# Patient Record
Sex: MALE | Race: WHITE | Employment: OTHER | URBAN - METROPOLITAN AREA
[De-identification: names, ages, dates, MRNs, and addresses within clinical notes are randomized per-mention and may not be internally consistent; named-entity substitution may affect disease eponyms.]

---

## 2017-08-30 ENCOUNTER — HOSPITAL ENCOUNTER (OUTPATIENT)
Dept: GENERAL RADIOLOGY | Age: 72
Discharge: HOME | End: 2017-08-30
Payer: MEDICARE

## 2017-08-30 DIAGNOSIS — M17.11 OSTEOARTHRITIS OF RIGHT KNEE, UNSPECIFIED OSTEOARTHRITIS TYPE: ICD-10-CM

## 2017-08-30 PROCEDURE — 73560 X-RAY EXAM OF KNEE 1 OR 2: CPT

## 2019-10-09 ENCOUNTER — HOSPITAL ENCOUNTER (OUTPATIENT)
Dept: GENERAL RADIOLOGY | Age: 74
Discharge: HOME | End: 2019-10-11
Payer: MEDICARE

## 2019-10-09 DIAGNOSIS — M17.11 OSTEOARTHRITIS OF RIGHT KNEE, UNSPECIFIED OSTEOARTHRITIS TYPE: ICD-10-CM

## 2019-10-09 PROCEDURE — 73560 X-RAY EXAM OF KNEE 1 OR 2: CPT

## 2024-02-02 ENCOUNTER — CLINICAL SUPPORT (OUTPATIENT)
Dept: ORTHOPEDIC SURGERY | Facility: CLINIC | Age: 79
End: 2024-02-02
Payer: COMMERCIAL

## 2024-02-02 ENCOUNTER — OFFICE VISIT (OUTPATIENT)
Dept: ORTHOPEDIC SURGERY | Facility: CLINIC | Age: 79
End: 2024-02-02
Payer: COMMERCIAL

## 2024-02-02 DIAGNOSIS — M17.11 PRIMARY OSTEOARTHRITIS OF RIGHT KNEE: Primary | ICD-10-CM

## 2024-02-02 DIAGNOSIS — M25.561 RIGHT KNEE PAIN, UNSPECIFIED CHRONICITY: ICD-10-CM

## 2024-02-02 PROCEDURE — 1036F TOBACCO NON-USER: CPT | Performed by: ORTHOPAEDIC SURGERY

## 2024-02-02 PROCEDURE — 1159F MED LIST DOCD IN RCRD: CPT | Performed by: ORTHOPAEDIC SURGERY

## 2024-02-02 PROCEDURE — 73564 X-RAY EXAM KNEE 4 OR MORE: CPT | Mod: RIGHT SIDE | Performed by: ORTHOPAEDIC SURGERY

## 2024-02-02 PROCEDURE — 99214 OFFICE O/P EST MOD 30 MIN: CPT | Performed by: ORTHOPAEDIC SURGERY

## 2024-02-02 ASSESSMENT — PAIN - FUNCTIONAL ASSESSMENT: PAIN_FUNCTIONAL_ASSESSMENT: NO/DENIES PAIN

## 2024-02-03 NOTE — PROGRESS NOTES
New patient to me 78-year-old gentleman presents complaining of right-sided knee pain states he had a long history of right-sided knee trouble he has had extensive conservative treatment he is having popping clicking grinding swelling stiffness and pain difficulty bearing weight difficulty stand for proper time symptoms seem to be getting progressively worse mostly on the medial side of the right knee.    Location of pain: Medial aspect right knee  Quality of pain: Ongoing pain for many years seems to be worse with activity better with rest  Modifying factors: Worse when he goes up and down stairs or stands for plan.  Time or walks on uneven ground better with rest  Associated signs and symptoms: Some popping clicking grinding and swelling  Previous treatment: He has had extensive conservative treatment with gel and cortisone without improvement        The patient's past medical history, family history, social history, and review of systems were documented on the patient's medical intake form.  The medical intake form was reviewed and scanned into the electronic medical record for future use.  History is otherwise negative except as stated in the HPI.    Physical exam    General: Alert and oriented to place, person, and time.  No acute distress and breathing comfortably; pleasant and cooperative with the examination.  HEENT: Head is normocephalic and atraumatic.  Neck: Supple, no visible swelling.  Cardiovascular: Good perfusion to the affected extremity.  Lungs: No audible wheezing or labored breathing.  Abdomen: Nondistended  HEME/Lymph : No visible abnormalities bilateral lower extremity    Extremity:  The affected knee was examined and inspected and was tender to touch along the medial aspect.  The patient has catching/locking and occasional mechanical symptoms.  The skin was intact without breakdown or open wounds.  There was a mild Beatriz exam seen with mild evidence of instability  and weakness in the collateral ligaments with laxity to varus valgus stress and in the anterior posterior plane.  There was a negative Lachman's test, pivot shift test, and posterior drawer sign.  There was no foot drop, numbness or tingling.  Sensation, reflexes, and pulses in the foot and ankle were present.  There was an effusion but range of motion was good and straight leg raise testing was normal.   The patient had the ability to bear weight but with discomfort.  The patient's gait was antalgic secondary to the discomfort. Knee range of motion was 5-115    Diagnostics:      XR knee right 4+ views    Result Date: 2/2/2024  Interpreted By:  Vicki Butt, STUDY: XR KNEE RIGHT 4+ VIEWS;  ;  2/2/2024 9:51 am   INDICATION: Signs/Symptoms:pain.   ACCESSION NUMBER(S): [id number]   ORDERING CLINICIAN: VICKI BUTT   FINDINGS: Right knee weightbearing four views. Severe knee arthritis is varus deformity bone-on-bone articulation medial joint space large osteophyte formation moderate knee effusion no signs of fracture dislocation or other bony abnormality     Signed by: Vicki Butt 2/2/2024 10:33 AM Dictation workstation:   [id number]         Procedures  [none   ]    Assessment:  Osteoarthritis right knee    Treatment plan:  1.  The natural history of the condition and its associated treatment alternatives including surgical and nonsurgical options were discussed with the patient at length.  2.  Patient with significant right-sided knee arthritis we discussed surgical nonsurgical options she is having severe impairment of bodily function is interested in knee replacement surgery.  He is trying to work out the timing of knee replacement surgery I explained it takes about 6 to 8 weeks for scheduling he is can a call schedule over the phone.  See me back for preop visit just prior.  The procedures risk benefits treatment surgeries and postop course were discussed and he understands wishes to  proceed.  3.  Patient has failed all forms of conservative treatment.  The joint pain is significantly affecting quality of life and activities of daily living.    The patient has pain in the joint that is increased with activity and weightbearing, and walks with an antalgic gait.  The pain is interfering with activities of daily living.  Patient has limited range of motion and pain with passive range of motion.  X-rays demonstrate joint space narrowing, subchondral sclerosis and osteophyte formation.  Symptoms are not improving with medication, physical therapy or supportive device for a period of at least 3 months.  Weight loss and smoking cessation have been discussed with the patient.  Patient advised on when to cease smoking 6-8 weeks before the procedure.      Patient would like to go and schedule joint replacement surgery.  The procedure its risks, benefits, and treatment alternatives were discussed at length and patient would like to proceed.  Patient is going to schedule the procedure at their earliest convenience and see me back for a preop visit just prior.  Preadmission testing, medical checkup, and insurance authorization will be performed in the meantime.  Patient understands a joint replacement surgery is a last resort, although a very successful operation results are not guaranteed.  4.  All of the patient's questions were answered.    Orders Placed This Encounter    XR knee right 4+ views       This note was prepared using voice recognition software.  The details of this note are correct and have been reviewed, and corrected to the best of my ability.  Some grammatical areas may persist related to the Dragon software    Dontrell Butt MD  Senior Attending Physician  [facility]  [facility] [place]    [phone]

## 2024-09-24 ENCOUNTER — OFFICE VISIT (OUTPATIENT)
Dept: ORTHOPEDIC SURGERY | Facility: CLINIC | Age: 79
End: 2024-09-24
Payer: COMMERCIAL

## 2024-09-24 DIAGNOSIS — M17.11 PRIMARY OSTEOARTHRITIS OF RIGHT KNEE: Primary | ICD-10-CM

## 2024-09-24 PROCEDURE — 1160F RVW MEDS BY RX/DR IN RCRD: CPT | Performed by: ORTHOPAEDIC SURGERY

## 2024-09-24 PROCEDURE — 99214 OFFICE O/P EST MOD 30 MIN: CPT | Performed by: ORTHOPAEDIC SURGERY

## 2024-09-24 PROCEDURE — 99213 OFFICE O/P EST LOW 20 MIN: CPT | Performed by: ORTHOPAEDIC SURGERY

## 2024-09-24 PROCEDURE — 1036F TOBACCO NON-USER: CPT | Performed by: ORTHOPAEDIC SURGERY

## 2024-09-24 PROCEDURE — 1159F MED LIST DOCD IN RCRD: CPT | Performed by: ORTHOPAEDIC SURGERY

## 2024-09-24 NOTE — PROGRESS NOTES
Subjective    Patient ID: Dontrell    Chief Complaint:   Chief Complaint   Patient presents with    Right Knee - Follow-up     RT. KNEE OA      History of present illness    79-year-old gentleman presented clinic today for follow-up evaluation right knee osteoarthritis.  At the time of his last appointment back in February he was interested in scheduling for total knee replacement however since then he has done some thinking and he wants to hold off on surgical intervention at this time.  He has had previous Synvisc injections done by Dr. Crispin Chacko which seem to help him in the past.  He is having pain stiffness swelling difficulty weightbearing.  He has been able to get through the day and is even working on a car at this time.      Past medical , Surgical, Family and social history reviewed.      Physical exam  General: No acute distress and breathing comfortably.  Patient is pleasant and cooperative with the examination.    Extremity  Right knee is neurovascular intact.  The affected knee was examined and inspected and was tender to touch along the medial aspect.  The patient has catching/locking and occasional mechanical symptoms.  The skin was intact without breakdown or open wounds.  There was a mild Beatriz exam seen without evidence of instability in the collateral ligaments or in the anterior posterior plane.  There was a negative Lachman's test, pivot shift test, and posterior drawer sign.  There was no foot drop, numbness or tingling.  Sensation, reflexes, and pulses in the foot and ankle were present.  There was an effusion but range of motion was good and straight leg raise testing was normal.  Knee range of motion was 5 degrees of extension to 115 degrees of flexion.  The patient had the ability to bear weight but with discomfort.  The patient's gait was antalgic secondary to the discomfort.    Diagnostics  [ none]    Procedure  [ none]    Assessment  Right knee osteoarthritis    Treatment  plan  1.  At this time we a long discussion regards to conservative or surgical intervention.  He wants to hold off on surgery at this time and proceed with viscosupplementation which she has had in the past.  2.  We will go ahead and submit for Synvisc injection to the right knee.  3.  We will follow-up with him once we get approved for these injections.  He would like to set up these appointments in [place].  For complete plan and/or surgical details, please refer to Dr. Butt's portion of the split/shared dictation.  4.  All of the patient's questions were answered.    In conclusion, this patient has osteoarthritis of the knee which is symptomatic.  This is causing significant morning stiffness lasting over an hour.  The patient has popping clicking and grinding in the knee with range of motion that is decreased and gets worse with prolonged standing or going up and down stairs.  This affects functional activities and activities of daily living.  There is radiographic evidence of osteoarthritis with joint space narrowing and marginal osteophyte formation.  This patient has also failed nonpharmacologic treatment for osteoarthritis including attempts at weight loss, and a home exercise program with or without physical therapy.  The patient has also failed pharmacologic treatments for osteoarthritis including over-the-counter analgesics, anti-inflammatory medication as well as injectable treatments.  For these reasons I feel the patient is a candidate for Visco supplementation injections of the knee.    No orders of the defined types were placed in this encounter.      This note was prepared using voice recognition software.  The details of this note are correct and have been reviewed, and corrected to the best of my ability.  Some grammatical areas may persist related to the Dragon software    Jona Isaac PA-C, [facility]  [facility] [place]    [phone]    In a face-to-face encounter  performed today, I Dontrell Butt MD performed a history and physical examination, discussed pertinent diagnostic studies if indicated, and discussed diagnosis and management strategies with both the patient and the midlevel provider.  I reviewed the midlevel's note and agree with the documented findings and plan of care.  Greater than 50% of the evaluation and treatment decision was performed by the physician myself during today's visit.    79-year-old gentleman I have seen previously ongoing issues with his right knee related to knee arthritis had been scheduled for previous knee replacement but decided to old off on things and he like to try gel as he had gel injections in the past.  On examination he has tenderness palpation in the medial joint space crepitus range of motion range of motion is 5-1 15.  Impression right knee arthritis.  Plan is submit for authorization for viscosupplementation call her back once approved.      Patient has severe impairment related to her presenting condition.  It is significantly impairing bodily function.  We did discuss surgical and nonsurgical options.    This note was prepared using voice recognition software.  The details of this note are correct and have been reviewed, and corrected to the best of my ability.  Some grammatical areas may persist related to the Dragon software    Dontrell Butt MD  Senior Attending Physician  [facility]    [phone]

## 2024-10-30 ENCOUNTER — APPOINTMENT (OUTPATIENT)
Dept: ORTHOPEDIC SURGERY | Facility: CLINIC | Age: 79
End: 2024-10-30
Payer: COMMERCIAL

## 2024-10-30 DIAGNOSIS — M17.11 PRIMARY OSTEOARTHRITIS OF RIGHT KNEE: Primary | ICD-10-CM

## 2024-10-30 PROCEDURE — 1036F TOBACCO NON-USER: CPT | Performed by: ORTHOPAEDIC SURGERY

## 2024-10-30 PROCEDURE — 1159F MED LIST DOCD IN RCRD: CPT | Performed by: ORTHOPAEDIC SURGERY

## 2024-10-30 PROCEDURE — 1160F RVW MEDS BY RX/DR IN RCRD: CPT | Performed by: ORTHOPAEDIC SURGERY

## 2024-10-30 PROCEDURE — 20610 DRAIN/INJ JOINT/BURSA W/O US: CPT | Performed by: ORTHOPAEDIC SURGERY

## 2024-11-06 ENCOUNTER — APPOINTMENT (OUTPATIENT)
Dept: ORTHOPEDIC SURGERY | Facility: CLINIC | Age: 79
End: 2024-11-06
Payer: COMMERCIAL

## 2024-11-06 DIAGNOSIS — M17.11 PRIMARY OSTEOARTHRITIS OF RIGHT KNEE: Primary | ICD-10-CM

## 2024-11-06 PROCEDURE — 1036F TOBACCO NON-USER: CPT | Performed by: ORTHOPAEDIC SURGERY

## 2024-11-06 PROCEDURE — 20610 DRAIN/INJ JOINT/BURSA W/O US: CPT | Performed by: ORTHOPAEDIC SURGERY

## 2024-11-06 PROCEDURE — 1160F RVW MEDS BY RX/DR IN RCRD: CPT | Performed by: ORTHOPAEDIC SURGERY

## 2024-11-06 PROCEDURE — 1159F MED LIST DOCD IN RCRD: CPT | Performed by: ORTHOPAEDIC SURGERY

## 2024-11-06 NOTE — PROGRESS NOTES
Subjective    Patient ID: Dontrell Guzman    Chief Complaint   Patient presents with    Right Knee - Injections     Synvisc #2       Inj/Asp: Right knee on 11/6/2024 11:03 AM  Indications: pain  Details: 22 G needle, anteromedial approach  Medications: 2 mL hylan 16 mg/2 mL  Outcome: tolerated well, no immediate complications  Procedure, treatment alternatives, risks and benefits explained, specific risks discussed. Consent was given by the patient. Immediately prior to procedure a time out was called to verify the correct patient, procedure, equipment, support staff and site/side marked as required. Patient was prepped and draped in the usual sterile fashion.         Most recent  Radiographs of the RIGHT knee were reviewed and revealed degenerative joint changes.  Patient has failed conservative management as outlined below:  Continued pain 6 weeks after last steroid injection. Last steroid injection done over 1 year ago.  Continued pain after 6 weeks with anti-inflammatory and/or analgesic medication(s). Dosage and daily intake: Ibuprofen 600-800 mg three times daily (or alternative NSAID equivalent) and Acetaminophen 1,000 mg three times daily  Continued pain after 6 weeks with physician-directed daily activity modification and/or physical therapy

## 2024-11-12 ENCOUNTER — APPOINTMENT (OUTPATIENT)
Dept: ORTHOPEDIC SURGERY | Facility: CLINIC | Age: 79
End: 2024-11-12
Payer: COMMERCIAL

## 2024-11-12 DIAGNOSIS — M17.11 PRIMARY OSTEOARTHRITIS OF RIGHT KNEE: Primary | ICD-10-CM

## 2024-11-12 PROCEDURE — 20610 DRAIN/INJ JOINT/BURSA W/O US: CPT | Performed by: ORTHOPAEDIC SURGERY

## 2024-11-12 PROCEDURE — 1036F TOBACCO NON-USER: CPT | Performed by: ORTHOPAEDIC SURGERY

## 2024-11-12 PROCEDURE — 1159F MED LIST DOCD IN RCRD: CPT | Performed by: ORTHOPAEDIC SURGERY

## 2024-11-12 NOTE — PROGRESS NOTES
History of Present Illness   Patient is here for a third Synvisc injection to his right knee     Review of Systems   GENERAL: Negative for malaise, significant weight loss, fever  MUSCULOSKELETAL: see HPI  NEURO:  Negative     No past medical history on file.     Medication Documentation Review Audit       Reviewed by Vicki Butt MD (Physician) on 11/06/24 at 1133      Medication Order Taking? Sig Documenting Provider Last Dose Status            No Medications to Display                                    Physical Exam  The skin is intact about the right knee, no erythema or warmth     Imaging  XR knee right 4+ views  Interpreted By:  Vicki Butt,   STUDY:  XR KNEE RIGHT 4+ VIEWS;  ;  2/2/2024 9:51 am      INDICATION:  Signs/Symptoms:pain.      ACCESSION NUMBER(S):  [id number]      ORDERING CLINICIAN:  VICKI BUTT      FINDINGS:  Right knee weightbearing four views. Severe knee arthritis is varus  deformity bone-on-bone articulation medial joint space large  osteophyte formation moderate knee effusion no signs of fracture  dislocation or other bony abnormality          Signed by: Vicki Butt 2/2/2024 10:33 AM  Dictation workstation:   [id number]       Assessment   Osteoarthrosis right knee     Plan  Third Synvisc injection right knee  Follow-up as needed  Questions answered    L Inj/Asp: R knee on 11/12/2024 11:39 AM  Indications: pain  Details: 21 G needle, anterolateral approach  Medications: 16 mg hylan 16 mg/2 mL  Outcome: tolerated well, no immediate complications  Procedure, treatment alternatives, risks and benefits explained, specific risks discussed. Consent was given by the patient. Immediately prior to procedure a time out was called to verify the correct patient, procedure, equipment, support staff and site/side marked as required. Patient was prepped and draped in the usual sterile fashion.

## 2025-07-03 ENCOUNTER — APPOINTMENT (OUTPATIENT)
Dept: ORTHOPEDIC SURGERY | Facility: CLINIC | Age: 80
End: 2025-07-03
Payer: MEDICARE

## 2025-07-03 DIAGNOSIS — M65.30 ACQUIRED TRIGGER FINGER: Primary | ICD-10-CM

## 2025-07-03 RX ORDER — LIDOCAINE HYDROCHLORIDE 10 MG/ML
0.5 INJECTION, SOLUTION INFILTRATION; PERINEURAL
Status: COMPLETED | OUTPATIENT
Start: 2025-07-03 | End: 2025-07-03

## 2025-07-03 RX ADMIN — LIDOCAINE HYDROCHLORIDE 0.5 ML: 10 INJECTION, SOLUTION INFILTRATION; PERINEURAL at 11:38

## 2025-07-03 NOTE — PROGRESS NOTES
History of Present Illness:  Presents for evaluation of left ring finger.  The symptoms have been present for months. The patient denies any inciting trauma. The pain is localized to the A1 pulley of the affected finger. It is described as moderate. The pain/locking occurs intermittantly and is more severe overnight and in the morning.       Review of Systems   GENERAL: Negative for malaise, significant weight loss, fever  MUSCULOSKELETAL: see HPI  NEURO:  Negative    The patient's past medical history, family history, social history, and review of systems were reviewed. History is otherwise negative except as stated in the HPI.    Physical Examination:  General: Alert and oriented to person, place, and time.  No acute distress and breathing comfortably: Pleasant and cooperative with examination.  HEENT: Head is normocephalic and atraumatic.  Neck: Supple, no visible swelling.  Cardiovascular: No palpable tachycardia  Lungs: No audible wheezing or labored breathing  Abdomen: Nondistended.  On musculoskeletal examination, the elbow and wrist have full, symmetric range of motion without obvious tenderness to palpation. Strength is full. Sensation and motor function are intact in the radial, ulnar, and median nerve distribution. There is no thenar or intrinsic atrophy with appropriate strength. There is obvious triggering of the left ring with tenderness over the corresponding A1 pulley. The adjacent fingers are unaffected. There is intact flexor and extensor tendon function. The patient can make a full composite fist but has pain while doing so. The hand itself is warm and well perfused. The skin is intact throughout. The contralateral hand and wrist are normal to inspection, range of motion, stability, and strength.    Imaging:  NA    Hand / UE Inj/Asp: L ring A1 for trigger finger on 7/3/2025 11:38 AM  Indications: pain  Details: 24 G needle, volar approach  Medications: 5 mg triamcinolone acetonide 10 mg/mL; 0.5  mL lidocaine 10 mg/mL (1 %)  Procedure, treatment alternatives, risks and benefits explained, specific risks discussed. Consent was given by the patient. Immediately prior to procedure a time out was called to verify the correct patient, procedure, equipment, support staff and site/side marked as required.             Assessment:  Patient with a left ring TF    Plan:  Injection. I had a long discussion with the patient regarding the diagnosis of trigger finger and the risks/benefits/expected outcomes of various treatment options.  At this point, the patient elected non-operative treatment consisting of a corticosteroid injection. I reviewed the specific risks of injection, which include, but are not limited to, infection, bleeding, pain, steroid flare, glycemic alteration, subcutaneous fat atrophy, skin hypopigmentation, soft tissue damage, and incomplete symptom relief. The patient consented to the injection. Then, using sterile technique, I injected 1mL of Kenalog 40 into the area of the flexor sheath at the level of the A1 pulley. The injection site was dressed, and the patient tolerated the injection well. Finally, I emphasized patience, as any benefit may take some time to manifest. Depending on the success of the injection, I will see them back on an as needed basis.        Dominique Chavis MD  Orthopaedic Surgeon

## 2025-08-20 ENCOUNTER — APPOINTMENT (OUTPATIENT)
Dept: ORTHOPEDIC SURGERY | Facility: CLINIC | Age: 80
End: 2025-08-20
Payer: MEDICARE

## 2025-08-20 DIAGNOSIS — M17.11 PRIMARY OSTEOARTHRITIS OF RIGHT KNEE: Primary | ICD-10-CM

## 2025-08-20 PROCEDURE — 1159F MED LIST DOCD IN RCRD: CPT | Performed by: ORTHOPAEDIC SURGERY

## 2025-08-20 PROCEDURE — 1036F TOBACCO NON-USER: CPT | Performed by: ORTHOPAEDIC SURGERY

## 2025-08-20 PROCEDURE — 99214 OFFICE O/P EST MOD 30 MIN: CPT | Performed by: ORTHOPAEDIC SURGERY
